# Patient Record
Sex: FEMALE | Race: WHITE
[De-identification: names, ages, dates, MRNs, and addresses within clinical notes are randomized per-mention and may not be internally consistent; named-entity substitution may affect disease eponyms.]

---

## 2018-06-16 ENCOUNTER — HOSPITAL ENCOUNTER (EMERGENCY)
Dept: HOSPITAL 62 - ER | Age: 23
Discharge: HOME | End: 2018-06-16
Payer: COMMERCIAL

## 2018-06-16 VITALS — SYSTOLIC BLOOD PRESSURE: 108 MMHG | DIASTOLIC BLOOD PRESSURE: 73 MMHG

## 2018-06-16 DIAGNOSIS — N93.8: Primary | ICD-10-CM

## 2018-06-16 DIAGNOSIS — F17.200: ICD-10-CM

## 2018-06-16 DIAGNOSIS — R00.0: ICD-10-CM

## 2018-06-16 LAB
APPEARANCE UR: (no result)
APTT PPP: YELLOW S
BILIRUB UR QL STRIP: NEGATIVE
ERYTHROCYTE [DISTWIDTH] IN BLOOD BY AUTOMATED COUNT: 12.2 % (ref 11.5–14)
GLUCOSE UR STRIP-MCNC: NEGATIVE MG/DL
HCT VFR BLD CALC: 36.8 % (ref 36–47)
HGB BLD-MCNC: 12.6 G/DL (ref 12–15.5)
KETONES UR STRIP-MCNC: NEGATIVE MG/DL
MCH RBC QN AUTO: 30.1 PG (ref 27–33.4)
MCHC RBC AUTO-ENTMCNC: 34.2 G/DL (ref 32–36)
MCV RBC AUTO: 88 FL (ref 80–97)
NITRITE UR QL STRIP: NEGATIVE
PH UR STRIP: 7 [PH] (ref 5–9)
PLATELET # BLD: 265 10^3/UL (ref 150–450)
PROT UR STRIP-MCNC: NEGATIVE MG/DL
RBC # BLD AUTO: 4.19 10^6/UL (ref 3.72–5.28)
SP GR UR STRIP: 1.01
UROBILINOGEN UR-MCNC: NEGATIVE MG/DL (ref ?–2)
WBC # BLD AUTO: 8.4 10^3/UL (ref 4–10.5)

## 2018-06-16 PROCEDURE — 81025 URINE PREGNANCY TEST: CPT

## 2018-06-16 PROCEDURE — 99284 EMERGENCY DEPT VISIT MOD MDM: CPT

## 2018-06-16 PROCEDURE — 81001 URINALYSIS AUTO W/SCOPE: CPT

## 2018-06-16 PROCEDURE — 85027 COMPLETE CBC AUTOMATED: CPT

## 2018-06-16 PROCEDURE — 36415 COLL VENOUS BLD VENIPUNCTURE: CPT

## 2018-06-16 NOTE — ER DOCUMENT REPORT
ED General





- General


Chief Complaint: Vaginal Bleeding


Stated Complaint: VAGINAL BLEEDING


Time Seen by Provider: 06/16/18 18:45


Notes: 





Patient is a 23-year-old female with a past medical history who presents with 

vaginal bleeding.  Patient states that she is concerned that she is not due for 

her menstrual cycle for another 20 days.  She notes that the bleeding is about 

the amount which would be typical for her usual menstrual cycle.  She notes 

some mild, intermittent, lower abdominal cramping.  Nothing improves or worsens 

her symptoms.  She denies any history of similar symptoms in the past.  She has 

not seen her general doctor regarding today's concerns.  She states that she 

came to the emergency department at the behest of her family.





- Related Data


Allergies/Adverse Reactions: 


 





No Known Allergies Allergy (Verified 06/16/18 18:43)


 











Past Medical History





- General


Information source: Patient


Last Menstrual Period: 6/1/18





- Social History


Smoking Status: Current Every Day Smoker


Chew tobacco use (# tins/day): No


Frequency of alcohol use: None


Drug Abuse: None


Lives with: Family


Family History: Reviewed & Not Pertinent


Patient has suicidal ideation: No


Patient has homicidal ideation: No


Renal/ Medical History: Denies: Hx Peritoneal Dialysis





Review of Systems





- Review of Systems


Notes: 





Constitutional: Negative for fever.


HENT: Negative for sore throat.


Eyes: Negative for visual changes.


Cardiovascular: Negative for chest pain.


Respiratory: Negative for shortness of breath.


Gastrointestinal: Negative for abdominal pain, vomiting or diarrhea.


Genitourinary: Positive for vaginal bleeding


Musculoskeletal: Negative for back pain.


Skin: Negative for rash.


Neurological: Negative for headaches, weakness or numbness.





10 point ROS negative except as marked above and in HPI.





Physical Exam





- Vital signs


Vitals: 


 











Temp Pulse Resp BP Pulse Ox


 


 99.1 F   120 H  20   126/87 H  99 


 


 06/16/18 18:37  06/16/18 18:37  06/16/18 18:37  06/16/18 18:37  06/16/18 18:37











Interpretation: Tachycardic


Notes: 





PHYSICAL EXAMINATION:





GENERAL: Well-appearing, well-nourished and in no acute distress.





HEAD: Atraumatic, normocephalic.





EYES: Pupils equal round and reactive to light, extraocular movements intact, 

sclera anicteric, conjunctiva are normal.





ENT: nares patent, oropharynx clear without exudates.  Moist mucous membranes.





NECK: Normal range of motion, supple without lymphadenopathy





LUNGS: Breath sounds clear to auscultation bilaterally and equal.  No wheezes 

rales or rhonchi.





HEART: Regular tachycardia without murmurs





ABDOMEN: Soft, nontender, normoactive bowel sounds.  No guarding, no rebound.  

No masses appreciated.





EXTREMITIES: Normal range of motion, no pitting or edema.  No cyanosis.





NEUROLOGICAL: No focal neurological deficits. Moves all extremities 

spontaneously and on command.





PSYCH: Normal mood, normal affect.





SKIN: Warm, Dry, normal turgor, no rashes or lesions noted.





Course





- Re-evaluation


Re-evalutation: 





06/16/18 19:49


Presentation is most consistent with dysfunctional uterine bleeding and 

otherwise well-appearing patient.  Her hemoglobin was within normal limits.  

She is not pregnant.  No tachycardia or hypotension at the time of my 

evaluation although patient was initially noted to be tachycardic (she does 

reportedly have a resting HR around ). Examination is otherwise 

unremarkable.  She denies bleeding through more than 2 pads an hour at any 

point in time.  No indication for ultrasound at this time based on benign exam, 

vitals and laboratories.  No active bleeding at this time.  At this time will 

discharge with return precautions and follow-up recommendations.  Verbal 

discharge instructions given a the bedside and opportunity for questions given. 

Medication warnings reviewed. Patient is in agreement with this plan and has 

verbalized understanding of return precautions and the need for primary care 

follow-up in the next 24-72 hours.





- Vital Signs


Vital signs: 


 











Temp Pulse Resp BP Pulse Ox


 


 97.5 F   98   17   108/73   98 


 


 06/16/18 20:47  06/16/18 20:47  06/16/18 20:47  06/16/18 20:47  06/16/18 20:47














- Laboratory


Result Diagrams: 


 06/16/18 20:10





Laboratory results interpreted by me: 


 











  06/16/18





  18:55


 


Urine Blood  LARGE H


 


Ur Leukocyte Esterase  SMALL H














Discharge





- Discharge


Clinical Impression: 


 Dysfunctional uterine bleeding





Condition: Stable


Disposition: HOME, SELF-CARE


Additional Instructions: 


You were seen today for dysfunctional uterine bleeding.  This is when you have 

vaginal bleeding and abdominal cramping off of your normal menstrual cycle. You 

need to follow-up with OB/GYN or your primary care physician the next 1-3 days.

  Return immediately if you worsening pain, you began bleeding through more 

than 2 pads per hour for more than 3 hours, you pass out, have persistent 

vomiting, develop a fever greater than 100.4F, or any other symptoms that are 

concerning to you.


Prescriptions: 


Clotrimazole 1 applic VG DAILY #1 [email or web address]


Referrals: 


KAILEY KAUR MD [Primary Care Provider] - Follow up as needed

## 2018-06-16 NOTE — ER DOCUMENT REPORT
ED Medical Screen (RME)





- General


Chief Complaint: Vaginal Bleeding


Stated Complaint: VAGINAL BLEEDING


Time Seen by Provider: 06/16/18 18:45


Notes: 





23-year-old female patient complaining of vaginal bleeding and some mild pain.  

States that this is strange because she had her period on June 1 which lasted 

for 4 days.  Then started bleeding again today.  Has never had anything like 

this happen before.  Denies any other major symptoms other than some mild 

discomfort with urination.





I have greeted and performed a rapid initial assessment of this patient.  A 

comprehensive ED assessment and evaluation of the patient, analysis of test 

results and completion of the medical decision making process will be conducted 

by additional ED providers.





- Related Data


Allergies/Adverse Reactions: 


 





No Known Allergies Allergy (Verified 06/16/18 18:43)


 











Past Medical History





- General


Last Menstrual Period: 6/1/18





- Social History


Chew tobacco use (# tins/day): No


Frequency of alcohol use: None


Drug Abuse: None


Renal/ Medical History: Denies: Hx Peritoneal Dialysis





Physical Exam





- Vital signs


Vitals: 


 











Temp Pulse Resp BP Pulse Ox


 


 99.1 F   120 H  20   126/87 H  99 


 


 06/16/18 18:37  06/16/18 18:37  06/16/18 18:37  06/16/18 18:37  06/16/18 18:37














Course





- Vital Signs


Vital signs: 


 











Temp Pulse Resp BP Pulse Ox


 


 99.1 F   104 H  17   126/87 H  100 


 


 06/16/18 18:37  06/16/18 19:22  06/16/18 19:22  06/16/18 18:37  06/16/18 19:22














- Laboratory


Laboratory results interpreted by me: 


 











  06/16/18





  18:55


 


Urine Blood  LARGE H


 


Ur Leukocyte Esterase  SMALL H